# Patient Record
Sex: FEMALE | Race: ASIAN | NOT HISPANIC OR LATINO
[De-identification: names, ages, dates, MRNs, and addresses within clinical notes are randomized per-mention and may not be internally consistent; named-entity substitution may affect disease eponyms.]

---

## 2023-12-28 ENCOUNTER — NON-APPOINTMENT (OUTPATIENT)
Age: 33
End: 2023-12-28

## 2024-01-02 ENCOUNTER — APPOINTMENT (OUTPATIENT)
Dept: ORTHOPEDIC SURGERY | Facility: CLINIC | Age: 34
End: 2024-01-02

## 2024-01-02 PROBLEM — Z00.00 ENCOUNTER FOR PREVENTIVE HEALTH EXAMINATION: Status: ACTIVE | Noted: 2024-01-02

## 2024-01-03 ENCOUNTER — APPOINTMENT (OUTPATIENT)
Dept: ORTHOPEDIC SURGERY | Facility: CLINIC | Age: 34
End: 2024-01-03
Payer: COMMERCIAL

## 2024-01-03 ENCOUNTER — NON-APPOINTMENT (OUTPATIENT)
Age: 34
End: 2024-01-03

## 2024-01-03 VITALS
SYSTOLIC BLOOD PRESSURE: 127 MMHG | HEART RATE: 78 BPM | HEIGHT: 63 IN | DIASTOLIC BLOOD PRESSURE: 80 MMHG | WEIGHT: 140 LBS | BODY MASS INDEX: 24.8 KG/M2

## 2024-01-03 DIAGNOSIS — S93.504A UNSPECIFIED SPRAIN OF RIGHT LESSER TOE(S), INITIAL ENCOUNTER: ICD-10-CM

## 2024-01-03 PROCEDURE — 99202 OFFICE O/P NEW SF 15 MIN: CPT

## 2024-01-03 RX ORDER — MELOXICAM 15 MG/1
15 TABLET ORAL DAILY
Qty: 10 | Refills: 1 | Status: ACTIVE | COMMUNITY
Start: 2024-01-03 | End: 1900-01-01

## 2024-01-03 NOTE — HISTORY OF PRESENT ILLNESS
[de-identified] : DACIA OLIVAS  is a 33 year old F who presents with pain in her right second toe.  She reports that about 3 to 4 months ago she began experiencing some mild pain in her right toe.  She says that initially it was manageable, but a few days ago she was pressing on the area and she developed severe pain in the toe.  She says that the next day she had difficulty walking and had severe pain and inflammation at the second toe.  She had pain particularly with extension of the toe.  She went to the urgent care where x-rays were negative.  She was given Voltaren gel.  She says that since then the pain has been improving and it is less swollen.

## 2024-01-03 NOTE — PHYSICAL EXAM
[de-identified] : General: No apparent distress Cardiovascular: extremities warm and well-perfused, no cyanosis Pulmonary: non labored respirations  Right foot: Mild swelling and tenderness to palpation about plantar aspect of second MTP joint Pain with passive extension of the second MTP joint MTP joint stable to varus and valgus stress Able to flex and extend all toes without restriction [de-identified] : X-rays of the right second toe obtained in the urgent care previously demonstrate no acute fracture or dislocation

## 2024-01-03 NOTE — DISCUSSION/SUMMARY
[de-identified] : Right second toe synovitis of the MTP joint.  Discussed with her that she appears to have inflammation around the second MTP joint.  Discussed that this can be treated nonoperatively.  I recommend modified shoewear so that she wears thick supportive soles.  She should also obtain a metatarsal pad.  I will prescribe her meloxicam 15 mg daily to be taken with food for the next 10 days to help her with the pain. I discussed with them that they should not take this while also taking Aleve (Naprosyn), Motrin/ Advil (Ibuprofen), Toradol (ketoralac). They must stop taking it if they develops stomach pain, increased bleeding or bruising and they should follow-up with their primary care doctor for routine blood work including kidney function to monitor its effect. While it Is not a habit-forming substance, it should only be taken as needed and to discontinue use once symptoms have resolved.  Discussed that if she continues to have pain after 3 to 4 weeks she should come back and see me for repeat evaluation.  I have personally obtained the history, reviewed the ROS as noted, and performed the physical examination today. The patient and I discussed the assessment and options and developed the plan. All questions were answered and the patient stated their understanding of the treatment plan and appreciation of the visit.  My cumulative time spent on this patient's visit included: Preparation for the visit, review of the medical records, review of pertinent diagnostic studies, examination and counseling of the patient on the above diagnosis, treatment plan and prognosis, orders of diagnostic tests, medications and/or appropriate procedures and documentation in the medical records of today's visit.  This note was generated using dragon medical dictation software.  A reasonable effort has been made for proofreading its contents, but typos may still remain.  If there are any questions or points of clarification needed please notify my office.